# Patient Record
Sex: FEMALE | URBAN - METROPOLITAN AREA
[De-identification: names, ages, dates, MRNs, and addresses within clinical notes are randomized per-mention and may not be internally consistent; named-entity substitution may affect disease eponyms.]

---

## 2020-11-10 ENCOUNTER — OFFICE VISIT (OUTPATIENT)
Dept: URBAN - METROPOLITAN AREA CLINIC 73 | Facility: CLINIC | Age: 85
End: 2020-11-10
Payer: MEDICARE

## 2020-11-10 PROCEDURE — 92014 COMPRE OPH EXAM EST PT 1/>: CPT | Performed by: OPHTHALMOLOGY

## 2020-11-10 ASSESSMENT — INTRAOCULAR PRESSURE
OS: 14
OD: 17

## 2020-11-10 NOTE — IMPRESSION/PLAN
Impression: Macular degeneration, wet OS. Status: Symptomatic.
s/p Lucentis x 33 last 4/24/18
s/p Avastin x 24 last 09/29/2020 (consented 08/04/2020) Plan: The patient notes metamorphopsia. Exam and OCT reveal scant IRF and a PED OS. An IVFA from 06/09/2020 demonstrated leakage. Fundus Photos from 06/09/2020 demonstrated drusen. Recommend Avastin. R/B/A discussed with patient. The risks of Avastin were discussed, including off-label use and compounding pharmacy risks, and the patient elects to proceed with Avastin (bilateral). Discussed AREDS / I Vit and Amsler grid.

## 2020-11-10 NOTE — IMPRESSION/PLAN
Impression: Macular degeneration, wet OD. Status: Symptomatic.
s/p Lucentis x 54 last 4/24/18
s/p Avastin x 25 last 09/29/2020 (consented 08/04/2020) Plan: The patient notes distortion. Exam and OCT reveal a PED and a blunted foveal depression OD. An IVFA from 06/09/2020 demonstrated leakage in the central macula. Fundus Photos from 06/09/2020 demonstrated drusen. Recommend Avastin. R/B/A discussed with patient. The risks of Avastin were discussed, including off-label use and compounding pharmacy risks, and the patient elects to proceed with Avastin (bilateral). The risks of bilateral injections were discussed, and the patient elects to proceed. After 2% Subconjunctival anesthesia & Betadine prep, 1.25mg of Avastin was injected in the eye. Cold compress and Tylenol suggested for pain if needed. Thanks, Batool Neal. 

Return in 4-6 weeks for OCT OU, re eval Nv AMD.

## 2020-12-08 ENCOUNTER — OFFICE VISIT (OUTPATIENT)
Dept: URBAN - METROPOLITAN AREA CLINIC 73 | Facility: CLINIC | Age: 85
End: 2020-12-08
Payer: MEDICARE

## 2020-12-08 DIAGNOSIS — H43.813 VITREOUS DEGENERATION, BILATERAL: ICD-10-CM

## 2020-12-08 DIAGNOSIS — H01.001 UNSPECIFIED BLEPHARITIS RIGHT UPPER EYELID: ICD-10-CM

## 2020-12-08 PROCEDURE — 92014 COMPRE OPH EXAM EST PT 1/>: CPT | Performed by: OPHTHALMOLOGY

## 2020-12-08 PROCEDURE — 92134 CPTRZ OPH DX IMG PST SGM RTA: CPT | Performed by: OPHTHALMOLOGY

## 2020-12-08 ASSESSMENT — INTRAOCULAR PRESSURE
OS: 16
OD: 13

## 2020-12-08 NOTE — IMPRESSION/PLAN
Impression: Macular degeneration, wet OD. Status: Symptomatic.
s/p Lucentis x 54 last 4/24/18
s/p Avastin x 26 last 11/10/2020 (consented 08/04/2020) Plan: The patient notes continued distortion. Exam and OCT reveal a PED and a blunted foveal depression OD. An IVFA from 06/09/2020 demonstrated leakage in the central macula. Fundus Photos from 06/09/2020 demonstrated drusen. Recommend Avastin. R/B/A discussed with patient. The risks of Avastin were discussed, including off-label use and compounding pharmacy risks, and the patient elects to proceed with Avastin (bilateral). The risks of bilateral injections were discussed, and the patient elects to proceed. After 2% Subconjunctival anesthesia & Betadine prep, 1.25mg of Avastin was injected in the eye. Cold compress and Tylenol suggested for pain if needed. Thanks, Vandana Garcia. 

Return in 4-6 weeks for OCT OU, re eval Nv AMD, IVFA OD 1st

## 2020-12-08 NOTE — IMPRESSION/PLAN
Impression: Macular degeneration, wet OS. Status: Symptomatic.
s/p Lucentis x 33 last 4/24/18
s/p Avastin x 25 last 11/10/2020 (consented 08/04/2020) Plan: The patient notes metamorphopsia. Exam and OCT reveal scant IRF and a PED OS. An IVFA from 06/09/2020 demonstrated leakage. Fundus Photos from 06/09/2020 demonstrated drusen. Recommend Avastin. R/B/A discussed with patient. The risks of Avastin were discussed, including off-label use and compounding pharmacy risks, and the patient elects to proceed with Avastin (bilateral). Discussed AREDS / I Vit and Amsler grid.

## 2021-01-19 ENCOUNTER — OFFICE VISIT (OUTPATIENT)
Dept: URBAN - METROPOLITAN AREA CLINIC 73 | Facility: CLINIC | Age: 86
End: 2021-01-19
Payer: MEDICARE

## 2021-01-19 PROCEDURE — 92134 CPTRZ OPH DX IMG PST SGM RTA: CPT | Performed by: OPHTHALMOLOGY

## 2021-01-19 PROCEDURE — 92014 COMPRE OPH EXAM EST PT 1/>: CPT | Performed by: OPHTHALMOLOGY

## 2021-01-19 PROCEDURE — 92235 FLUORESCEIN ANGRPH MLTIFRAME: CPT | Performed by: OPHTHALMOLOGY

## 2021-01-19 ASSESSMENT — INTRAOCULAR PRESSURE
OD: 15
OS: 17

## 2021-01-19 NOTE — IMPRESSION/PLAN
Impression: Macular degeneration, wet OS. Status: Symptomatic.
s/p Lucentis x 33 last 4/24/18
s/p Avastin x 25 last 11/10/2020 (consented 08/04/2020) Plan: The patient notes metamorphopsia. Exam and OCT reveal scant IRF and a PED OS. An IVFA from 01/19/2021 demonstrated leakage. Fundus Photos from 01/19/2021 demonstrated drusen. Recommend Avastin. R/B/A discussed with patient. The risks of Avastin were discussed, including off-label use and compounding pharmacy risks, and the patient elects to proceed with Avastin (bilateral). Discussed AREDS / I Vit and Amsler grid.

## 2021-01-19 NOTE — IMPRESSION/PLAN
Impression: Macular degeneration, wet OD. Status: Symptomatic.
s/p Lucentis x 54 last 4/24/18
s/p Avastin x 27 last 12/8/2020 (consented 08/04/2020) Plan: Exam and OCT reveal a PED and a blunted foveal depression OD. An IVFA from 01/19/2021 demonstrated leakage in the central macula. Fundus Photos from 01/19/2021 demonstrated drusen. Recommend Avastin. R/B/A discussed with patient. The risks of Avastin were discussed, including off-label use and compounding pharmacy risks, and the patient elects to proceed with Avastin (bilateral). The risks of bilateral injections were discussed, and the patient elects to proceed. After 2% Subconjunctival anesthesia & Betadine prep, 1.25mg of Avastin was injected in the eye. Cold compress and Tylenol suggested for pain if needed. Thanks, Archana Lerma. 

Return in 4-6 weeks for OCT OU, re eval Nv AMD, possible Lucentis OU Ria Hu)

## 2021-02-16 ENCOUNTER — OFFICE VISIT (OUTPATIENT)
Dept: URBAN - METROPOLITAN AREA CLINIC 73 | Facility: CLINIC | Age: 86
End: 2021-02-16
Payer: MEDICARE

## 2021-02-16 DIAGNOSIS — H04.123 DRY EYE SYNDROME OF BILATERAL LACRIMAL GLANDS: ICD-10-CM

## 2021-02-16 PROCEDURE — 92014 COMPRE OPH EXAM EST PT 1/>: CPT | Performed by: OPHTHALMOLOGY

## 2021-02-16 ASSESSMENT — INTRAOCULAR PRESSURE
OD: 14
OS: 19

## 2021-02-16 NOTE — IMPRESSION/PLAN
Impression: Macular degeneration, wet OS. Status: Symptomatic.
s/p Lucentis x 33 last 4/24/18
s/p Avastin x 26 last 01/19/2021 (consented 08/04/2020) Plan: The patient notes metamorphopsia. Exam and OCT reveal scant IRF and a PED OS. An IVFA from 01/19/2021 demonstrated leakage. Fundus Photos from 01/19/2021 demonstrated drusen. Recommend Lucentis R/B/A discussed with patient, and the patient elects to proceed with Lucentis (bilateral). Discussed AREDS / I Vit and Amsler grid.

## 2021-02-16 NOTE — IMPRESSION/PLAN
Impression: Macular degeneration, wet OD. Status: Symptomatic.
s/p Lucentis x 54 last 4/24/18
s/p Avastin x 28 last 01/19/2021 (consented 08/04/2020) Plan: The patient notes worsening. Exam and OCT reveal a PED and a blunted foveal depression OD. An IVFA from 01/19/2021 demonstrated leakage in the central macula. Fundus Photos from 01/19/2021 demonstrated drusen. Recommend Lucentis. R/B/A discussed, and the patient elects to proceed with Lucentis (bilateral). Thanks, Maryhelen Aschoff. 

Return in 4-6 weeks for OCT OU, re eval Nv AMD, possible Lucentis OU

## 2021-03-16 ENCOUNTER — OFFICE VISIT (OUTPATIENT)
Dept: URBAN - METROPOLITAN AREA CLINIC 73 | Facility: CLINIC | Age: 86
End: 2021-03-16
Payer: MEDICARE

## 2021-03-16 PROCEDURE — 92014 COMPRE OPH EXAM EST PT 1/>: CPT | Performed by: OPHTHALMOLOGY

## 2021-03-16 PROCEDURE — 92134 CPTRZ OPH DX IMG PST SGM RTA: CPT | Performed by: OPHTHALMOLOGY

## 2021-03-16 ASSESSMENT — INTRAOCULAR PRESSURE
OD: 14
OS: 17

## 2021-03-16 NOTE — IMPRESSION/PLAN
Impression: Macular degeneration, wet OD. Status: Symptomatic.
s/p Lucentis x 54 last 4/24/18
s/p Avastin x 29  last 02/16/2021 (consented 08/04/2020) Plan: Exam and OCT reveal a PED and a blunted foveal depression OD. An IVFA from 01/19/2021 demonstrated leakage in the central macula. Fundus Photos from 01/19/2021 demonstrated drusen. Recommend Lucentis. R/B/A discussed, and the patient elects to proceed with Lucentis (bilateral). Thanks, Robson Mccurdy. 

Return in 4-6 weeks for OCT OU, re eval Nv AMD, possible Lucentis OU

## 2021-03-16 NOTE — IMPRESSION/PLAN
Impression: Macular degeneration, wet OS. Status: Symptomatic.
s/p Lucentis x 33 last 4/24/18
s/p Avastin x 27  last 02/16/2021 (consented 08/04/2020) Plan: The patient notes metamorphopsia. Exam and OCT reveal scant IRF and a PED OS. An IVFA from 01/19/2021 demonstrated leakage. Fundus Photos from 01/19/2021 demonstrated drusen. Recommend Lucentis R/B/A discussed with patient, and the patient elects to proceed with Lucentis (bilateral). Discussed AREDS / I Vit and Amsler grid.

## 2021-04-13 ENCOUNTER — OFFICE VISIT (OUTPATIENT)
Dept: URBAN - METROPOLITAN AREA CLINIC 73 | Facility: CLINIC | Age: 86
End: 2021-04-13
Payer: MEDICARE

## 2021-04-13 PROCEDURE — 92134 CPTRZ OPH DX IMG PST SGM RTA: CPT | Performed by: OPHTHALMOLOGY

## 2021-04-13 PROCEDURE — 92014 COMPRE OPH EXAM EST PT 1/>: CPT | Performed by: OPHTHALMOLOGY

## 2021-04-13 ASSESSMENT — INTRAOCULAR PRESSURE
OD: 15
OS: 17

## 2021-04-13 NOTE — IMPRESSION/PLAN
Impression: Macular degeneration, wet OD. Status: Symptomatic.
s/p Lucentis x 55  last 03/16/2021 
s/p Avastin x 29  last 02/16/2021 (consented 08/04/2020) Plan: Exam and OCT reveal a PED and a blunted foveal depression OD. An IVFA from 01/19/2021 demonstrated leakage in the central macula. Fundus Photos from 01/19/2021 demonstrated drusen. Recommend Lucentis. R/B/A discussed, and the patient elects to proceed with Lucentis (bilateral). Thanks, Josh Raymundo. 

Return in 4-6 weeks for OCT OU, re eval Nv AMD, possible Lucentis OU

## 2021-04-13 NOTE — IMPRESSION/PLAN
Impression: Macular degeneration, wet OS. Status: Symptomatic.
s/p Lucentis x 34  last 03/16/2021 
s/p Avastin x 27  last 02/16/2021 (consented 08/04/2020) Plan: The patient notes metamorphopsia. Exam and OCT reveal scant IRF and a PED OS. An IVFA from 01/19/2021 demonstrated leakage. Fundus Photos from 01/19/2021 demonstrated drusen. Recommend Lucentis R/B/A discussed with patient, and the patient elects to proceed with Lucentis (bilateral). Discussed AREDS / I Vit and Amsler grid.

## 2021-05-25 ENCOUNTER — OFFICE VISIT (OUTPATIENT)
Dept: URBAN - METROPOLITAN AREA CLINIC 73 | Facility: CLINIC | Age: 86
End: 2021-05-25
Payer: MEDICARE

## 2021-05-25 PROCEDURE — 92014 COMPRE OPH EXAM EST PT 1/>: CPT | Performed by: OPHTHALMOLOGY

## 2021-05-25 ASSESSMENT — INTRAOCULAR PRESSURE
OS: 20
OD: 15

## 2021-05-25 NOTE — IMPRESSION/PLAN
Impression: Macular degeneration, wet OS. Status: Symptomatic.
s/p Lucentis x 35  last 04/13/2021
s/p Avastin x 27  last 02/16/2021 (consented 08/04/2020) Plan: The patient notes metamorphopsia. Exam and OCT reveal scant IRF and a PED OS. An IVFA from 01/19/2021 demonstrated leakage. Fundus Photos from 01/19/2021 demonstrated drusen. Recommend Lucentis R/B/A discussed with patient, and the patient elects to proceed with Lucentis (bilateral). Discussed AREDS / I Vit and Amsler grid.

## 2021-05-25 NOTE — IMPRESSION/PLAN
Impression: Macular degeneration, wet OD. Status: Symptomatic.
s/p Lucentis x 56  last 04/13/2021
s/p Avastin x 29  last 02/16/2021 (consented 08/04/2020) Plan: The patient notes blurring. Exam and OCT reveal a PED and a blunted foveal depression OD. An IVFA from 01/19/2021 demonstrated leakage in the central macula. Fundus Photos from 01/19/2021 demonstrated drusen. Recommend Lucentis. R/B/A discussed, and the patient elects to proceed with Lucentis (bilateral). Thanks, Jeffery Henriquez. 

Return in 4-6 weeks for OCT OU, re eval Nv AMD, possible Lucentis OU

## 2021-07-06 ENCOUNTER — OFFICE VISIT (OUTPATIENT)
Dept: URBAN - METROPOLITAN AREA CLINIC 73 | Facility: CLINIC | Age: 86
End: 2021-07-06
Payer: MEDICARE

## 2021-07-06 PROCEDURE — 92014 COMPRE OPH EXAM EST PT 1/>: CPT | Performed by: OPHTHALMOLOGY

## 2021-07-06 ASSESSMENT — INTRAOCULAR PRESSURE
OD: 13
OS: 14

## 2021-07-06 NOTE — IMPRESSION/PLAN
Impression: Macular degeneration, wet OS. Status: Symptomatic.
s/p Lucentis x 36  last 05/25/2021
s/p Avastin x 27  last 02/16/2021 Plan: The patient notes metamorphopsia. Exam and OCT reveal scant IRF and a PED OS. An IVFA from 01/19/2021 demonstrated leakage. Fundus Photos from 01/19/2021 demonstrated drusen. Recommend Lucentis R/B/A discussed with patient, and the patient elects to proceed with Lucentis (bilateral). Discussed AREDS / I Vit and Amsler grid.

## 2021-07-06 NOTE — IMPRESSION/PLAN
Impression: Macular degeneration, wet OD. Status: Symptomatic.
s/p Lucentis x 57  last 05/25/2021
s/p Avastin x 29  last 02/16/2021 Plan: The patient notes continued blurring. Exam and OCT reveal a PED and a blunted foveal depression OD. An IVFA from 01/19/2021 demonstrated leakage in the central macula. Fundus Photos from 01/19/2021 demonstrated drusen. Recommend Lucentis. R/B/A discussed, and the patient elects to proceed with Lucentis (bilateral). Thanks, Earnestine Sanches. 

Return in 4-6 weeks for OCT OU, re eval Nv AMD, possible Lucentis OU, IVFA OD 1st

## 2021-08-31 ENCOUNTER — OFFICE VISIT (OUTPATIENT)
Dept: URBAN - METROPOLITAN AREA CLINIC 73 | Facility: CLINIC | Age: 86
End: 2021-08-31
Payer: MEDICARE

## 2021-08-31 PROCEDURE — 92014 COMPRE OPH EXAM EST PT 1/>: CPT | Performed by: OPHTHALMOLOGY

## 2021-08-31 PROCEDURE — 92235 FLUORESCEIN ANGRPH MLTIFRAME: CPT | Performed by: OPHTHALMOLOGY

## 2021-08-31 PROCEDURE — 92250 FUNDUS PHOTOGRAPHY W/I&R: CPT | Performed by: OPHTHALMOLOGY

## 2021-08-31 ASSESSMENT — INTRAOCULAR PRESSURE
OS: 17
OD: 14

## 2021-08-31 NOTE — IMPRESSION/PLAN
Impression: Macular degeneration, wet OD. Status: Symptomatic.
s/p Lucentis x 58  last 07/06/2021 
s/p Avastin x 29  last 02/16/2021 Plan: Exam and OCT reveal a PED and a blunted foveal depression OD. An IVFA from 08/31/2021 demonstrated leakage in the central macula. Fundus Photos from 08/31/2021 demonstrated drusen. Recommend Lucentis. R/B/A discussed, and the patient elects to proceed with Lucentis (bilateral). Will consider Avastin.  

Return in 4-6 weeks for OCT OU, re eval Nv AMD, possible Avastin OU Amber Greenwood

## 2021-09-28 ENCOUNTER — OFFICE VISIT (OUTPATIENT)
Dept: URBAN - METROPOLITAN AREA CLINIC 73 | Facility: CLINIC | Age: 86
End: 2021-09-28
Payer: MEDICARE

## 2021-09-28 PROCEDURE — 92134 CPTRZ OPH DX IMG PST SGM RTA: CPT | Performed by: OPHTHALMOLOGY

## 2021-09-28 PROCEDURE — 92014 COMPRE OPH EXAM EST PT 1/>: CPT | Performed by: OPHTHALMOLOGY

## 2021-09-28 ASSESSMENT — INTRAOCULAR PRESSURE
OS: 20
OD: 16

## 2021-09-28 NOTE — IMPRESSION/PLAN
Impression: Macular degeneration, wet OS. Status: Symptomatic.
s/p Lucentis x 38  last 08/31/2021 
s/p Avastin x 27  last 02/16/2021 Plan: The patient notes metamorphopsia. Exam and OCT reveal scant IRF and a PED OS. An IVFA from 08/31/2021 demonstrated leakage. Fundus Photos from 08/31/2021 demonstrated drusen. Recommend Avastin. R/B/A discussed with patient. The risks of Avastin were discussed, including off-label use and compounding pharmacy risks, and the patient elects to proceed with Avastin. After 2% Subconjunctival anesthesia & betadine prep, 1.25mg of Avastin was injected in the eye. Cold compress and Tylenol suggested for pain if needed.

## 2021-09-28 NOTE — IMPRESSION/PLAN
Impression: Macular degeneration, wet OD. Status: Symptomatic.
s/p Lucentis x 59  last 08/31/2021 
s/p Avastin x 29  last 02/16/2021 Plan: Exam and OCT reveal a PED and a blunted foveal depression OD. An IVFA from 08/31/2021 demonstrated leakage in the central macula. Fundus Photos from 08/31/2021 demonstrated drusen. Recommend Avastin. R/B/A discussed with patient. The risks of Avastin were discussed, including off-label use and compounding pharmacy risks, and the patient elects to proceed with Avastin. After 2% Subconjunctival anesthesia & betadine prep, 1.25mg of Avastin was injected in the eye. Cold compress and Tylenol suggested for pain if needed. 

Return in 4-6 weeks for OCT OU, re eval Nv AMD, possible Avastin OU

## 2021-11-09 ENCOUNTER — OFFICE VISIT (OUTPATIENT)
Dept: URBAN - METROPOLITAN AREA CLINIC 73 | Facility: CLINIC | Age: 86
End: 2021-11-09
Payer: MEDICARE

## 2021-11-09 PROCEDURE — 92014 COMPRE OPH EXAM EST PT 1/>: CPT | Performed by: OPHTHALMOLOGY

## 2021-11-09 ASSESSMENT — INTRAOCULAR PRESSURE
OS: 15
OD: 13

## 2021-11-09 NOTE — IMPRESSION/PLAN
Impression: Macular degeneration, wet OS. Status: Symptomatic.
s/p Lucentis x 38  last 08/31/2021 
s/p Avastin x 28   last 09/28/2021  Plan: The patient notes metamorphopsia. Exam and OCT reveal scant IRF and a PED OS. An IVFA from 08/31/2021 demonstrated leakage. Fundus Photos from 08/31/2021 demonstrated drusen. Recommend Avastin. R/B/A discussed with patient. The risks of Avastin were discussed, including off-label use and compounding pharmacy risks, and the patient elects to proceed with Avastin. After 2% Subconjunctival anesthesia & betadine prep, 1.25mg of Avastin was injected in the eye. Cold compress and Tylenol suggested for pain if needed.

## 2021-11-09 NOTE — IMPRESSION/PLAN
Impression: Macular degeneration, wet OD. Status: Symptomatic.
s/p Lucentis x 59  last 08/31/2021 
s/p Avastin x 30  last 09/28/2021 Plan: The patient notes blurring. Exam and OCT reveal a PED and a blunted foveal depression OD. An IVFA from 08/31/2021 demonstrated leakage in the central macula. Fundus Photos from 08/31/2021 demonstrated drusen. Recommend Avastin. R/B/A discussed with patient. The risks of Avastin were discussed, including off-label use and compounding pharmacy risks, and the patient elects to proceed with Avastin. After 2% Subconjunctival anesthesia & betadine prep, 1.25mg of Avastin was injected in the eye. Cold compress and Tylenol suggested for pain if needed. 

Return in 4-6 weeks for OCT OU, re eval Nv AMD, possible Avastin OU

## 2021-12-07 ENCOUNTER — OFFICE VISIT (OUTPATIENT)
Dept: URBAN - METROPOLITAN AREA CLINIC 73 | Facility: CLINIC | Age: 86
End: 2021-12-07
Payer: MEDICARE

## 2021-12-07 PROCEDURE — 92014 COMPRE OPH EXAM EST PT 1/>: CPT | Performed by: OPHTHALMOLOGY

## 2021-12-07 ASSESSMENT — INTRAOCULAR PRESSURE
OD: 11
OS: 17

## 2021-12-07 NOTE — IMPRESSION/PLAN
Impression: Macular degeneration, wet OS. Status: Symptomatic.
s/p Lucentis x 38  last 08/31/2021 
s/p Avastin x 29   last 11/09/2021 Plan: The patient notes metamorphopsia. Exam and OCT reveal scant IRF and a PED OS. An IVFA from 08/31/2021 demonstrated leakage. Fundus Photos from 08/31/2021 demonstrated drusen. Recommend Avastin. R/B/A discussed with patient. The risks of Avastin were discussed, including off-label use and compounding pharmacy risks, and the patient elects to proceed with Avastin. After 2% Subconjunctival anesthesia & betadine prep, 1.25mg of Avastin was injected in the eye. Cold compress and Tylenol suggested for pain if needed.

## 2021-12-07 NOTE — IMPRESSION/PLAN
Impression: Macular degeneration, wet OD. Status: Symptomatic.
s/p Lucentis x 59  last 08/31/2021 
s/p Avastin x 31  last 11/09/2021 Plan: The patient notes continued blurring. Exam and OCT reveal a PED and a blunted foveal depression OD. An IVFA from 08/31/2021 demonstrated leakage in the central macula. Fundus Photos from 08/31/2021 demonstrated drusen. Recommend Avastin. R/B/A discussed with patient. The risks of Avastin were discussed, including off-label use and compounding pharmacy risks, and the patient elects to proceed with Avastin. After 2% Subconjunctival anesthesia & betadine prep, 1.25mg of Avastin was injected in the eye. Cold compress and Tylenol suggested for pain if needed. 

Return in 4-6 weeks for OCT OU, re eval Nv AMD, possible Avastin OU

## 2022-01-04 ENCOUNTER — OFFICE VISIT (OUTPATIENT)
Dept: URBAN - METROPOLITAN AREA CLINIC 73 | Facility: CLINIC | Age: 87
End: 2022-01-04
Payer: MEDICARE

## 2022-01-04 DIAGNOSIS — Z96.1 PRESENCE OF INTRAOCULAR LENS: ICD-10-CM

## 2022-01-04 PROCEDURE — 92134 CPTRZ OPH DX IMG PST SGM RTA: CPT | Performed by: OPHTHALMOLOGY

## 2022-01-04 PROCEDURE — 92014 COMPRE OPH EXAM EST PT 1/>: CPT | Performed by: OPHTHALMOLOGY

## 2022-01-04 ASSESSMENT — INTRAOCULAR PRESSURE
OS: 16
OD: 13

## 2022-01-04 NOTE — IMPRESSION/PLAN
Impression: Macular degeneration, wet OD. Status: Symptomatic.
s/p Lucentis x 59  last 08/31/2021 
s/p Avastin x 32   last 12/07/2021  Plan: Exam and OCT reveal a PED and a blunted foveal depression OD. An IVFA from 08/31/2021 demonstrated leakage in the central macula. Fundus Photos from 08/31/2021 demonstrated drusen. Recommend Avastin. R/B/A discussed with patient. The risks of Avastin were discussed, including off-label use and compounding pharmacy risks, and the patient elects to proceed with Avastin. After 2% Subconjunctival anesthesia & betadine prep, 1.25mg of Avastin was injected in the eye. Cold compress and Tylenol suggested for pain if needed. 

Return in 4-6 weeks for OCT OU, re eval Nv AMD, possible Avastin OU, IVFA OD 1st

## 2022-01-04 NOTE — IMPRESSION/PLAN
Impression: Macular degeneration, wet OS. Status: Symptomatic.
s/p Lucentis x 38  last 08/31/2021 
s/p Avastin x 29   last 12/07/2021  Plan: The patient notes metamorphopsia. Exam and OCT reveal scant IRF and a PED OS. An IVFA from 08/31/2021 demonstrated leakage. Fundus Photos from 08/31/2021 demonstrated drusen. Recommend Avastin. R/B/A discussed with patient. The risks of Avastin were discussed, including off-label use and compounding pharmacy risks, and the patient elects to proceed with Avastin. After 2% Subconjunctival anesthesia & betadine prep, 1.25mg of Avastin was injected in the eye. Cold compress and Tylenol suggested for pain if needed.

## 2022-02-01 ENCOUNTER — OFFICE VISIT (OUTPATIENT)
Dept: URBAN - METROPOLITAN AREA CLINIC 73 | Facility: CLINIC | Age: 87
End: 2022-02-01
Payer: MEDICARE

## 2022-02-01 DIAGNOSIS — H35.3231 EXUDATIVE AGE-REL MCLR DEGN, BI, WITH ACTV CHRDL NEOVAS: Primary | ICD-10-CM

## 2022-02-01 PROCEDURE — 92134 CPTRZ OPH DX IMG PST SGM RTA: CPT | Performed by: OPHTHALMOLOGY

## 2022-02-01 PROCEDURE — 92014 COMPRE OPH EXAM EST PT 1/>: CPT | Performed by: OPHTHALMOLOGY

## 2022-02-01 PROCEDURE — 92235 FLUORESCEIN ANGRPH MLTIFRAME: CPT | Performed by: OPHTHALMOLOGY

## 2022-02-01 ASSESSMENT — INTRAOCULAR PRESSURE
OD: 9
OS: 12

## 2022-02-01 NOTE — IMPRESSION/PLAN
Impression: Macular degeneration, wet OD. Status: Symptomatic.
s/p Lucentis x 59  last 08/31/2021 
s/p Avastin x 33   last 01/04/2022  Plan: The patient notes blurring. Exam and OCT reveal a PED and a blunted foveal depression OD. An IVFA from 02/01/2022 demonstrated leakage in the central macula. Fundus Photos from 02/01/2022 demonstrated drusen. Recommend Avastin. R/B/A discussed with patient. The risks of Avastin were discussed, including off-label use and compounding pharmacy risks, and the patient elects to proceed with Avastin. After 2% Subconjunctival anesthesia & betadine prep, 1.25mg of Avastin was injected in the eye. Cold compress and Tylenol suggested for pain if needed. 

Return in 4-6 weeks for OCT OU, re eval Nv AMD, possible Avastin OU

## 2022-02-01 NOTE — IMPRESSION/PLAN
Impression: Macular degeneration, wet OS. Status: Symptomatic.
s/p Lucentis x 38  last 08/31/2021 
s/p Avastin x 30   last 01/04/2022  Plan: The patient notes metamorphopsia. Exam and OCT reveal scant IRF and a PED OS. An IVFA from 02/01/2022 demonstrated leakage. Fundus Photos from 02/01/2022 demonstrated drusen. Recommend Avastin. R/B/A discussed with patient. The risks of Avastin were discussed, including off-label use and compounding pharmacy risks, and the patient elects to proceed with Avastin. After 2% Subconjunctival anesthesia & betadine prep, 1.25mg of Avastin was injected in the eye. Cold compress and Tylenol suggested for pain if needed.

## 2022-03-15 ENCOUNTER — OFFICE VISIT (OUTPATIENT)
Dept: URBAN - METROPOLITAN AREA CLINIC 73 | Facility: CLINIC | Age: 87
End: 2022-03-15
Payer: MEDICARE

## 2022-03-15 PROCEDURE — 92134 CPTRZ OPH DX IMG PST SGM RTA: CPT | Performed by: OPHTHALMOLOGY

## 2022-03-15 PROCEDURE — 92014 COMPRE OPH EXAM EST PT 1/>: CPT | Performed by: OPHTHALMOLOGY

## 2022-03-15 ASSESSMENT — INTRAOCULAR PRESSURE
OS: 15
OD: 12

## 2022-03-15 NOTE — IMPRESSION/PLAN
Impression: Macular degeneration, wet OS. Status: Symptomatic.
s/p Lucentis x 38  last 08/31/2021 
s/p Avastin x 31  last 02/01/2022  Plan: The patient notes metamorphopsia. Exam and OCT reveal scant IRF and a PED OS. An IVFA from 02/01/2022 demonstrated leakage. Fundus Photos from 02/01/2022 demonstrated drusen. Recommend Avastin. R/B/A discussed with patient. The risks of Avastin were discussed, including off-label use and compounding pharmacy risks, and the patient elects to proceed with Avastin. After 2% Subconjunctival anesthesia & betadine prep, 1.25mg of Avastin was injected in the eye. Cold compress and Tylenol suggested for pain if needed.

## 2022-03-15 NOTE — IMPRESSION/PLAN
Impression: Macular degeneration, wet OD. Status: Symptomatic.
s/p Lucentis x 59  last 08/31/2021 
s/p Avastin x 34  last 02/01/2022 Plan: The patient notes continued blurring. Exam and OCT reveal a PED and a blunted foveal depression OD. An IVFA from 02/01/2022 demonstrated leakage in the central macula. Fundus Photos from 02/01/2022 demonstrated drusen. Recommend Avastin. R/B/A discussed with patient. The risks of Avastin were discussed, including off-label use and compounding pharmacy risks, and the patient elects to proceed with Avastin. After 2% Subconjunctival anesthesia & betadine prep, 1.25mg of Avastin was injected in the eye. Cold compress and Tylenol suggested for pain if needed. 

Return in 4-6 weeks for OCT OU, re eval Nv AMD, possible Avastin OU

## 2022-04-26 ENCOUNTER — OFFICE VISIT (OUTPATIENT)
Dept: URBAN - METROPOLITAN AREA CLINIC 73 | Facility: CLINIC | Age: 87
End: 2022-04-26
Payer: MEDICARE

## 2022-04-26 DIAGNOSIS — H35.3231 EXUDATIVE AGE-REL MCLR DEGN, BI, WITH ACTV CHRDL NEOVAS: Primary | ICD-10-CM

## 2022-04-26 DIAGNOSIS — H01.001 UNSPECIFIED BLEPHARITIS RIGHT UPPER EYELID: ICD-10-CM

## 2022-04-26 DIAGNOSIS — Z96.1 PRESENCE OF INTRAOCULAR LENS: ICD-10-CM

## 2022-04-26 DIAGNOSIS — H43.813 VITREOUS DEGENERATION, BILATERAL: ICD-10-CM

## 2022-04-26 PROCEDURE — 92014 COMPRE OPH EXAM EST PT 1/>: CPT | Performed by: OPHTHALMOLOGY

## 2022-04-26 PROCEDURE — 92134 CPTRZ OPH DX IMG PST SGM RTA: CPT | Performed by: OPHTHALMOLOGY

## 2022-04-26 ASSESSMENT — INTRAOCULAR PRESSURE
OD: 14
OS: 17

## 2022-04-26 NOTE — IMPRESSION/PLAN
Impression: Macular degeneration, wet OD. Status: Symptomatic.
s/p Lucentis x 59  last 08/31/2021 
s/p Avastin x 35  last 03/15/2022  Plan: Exam and OCT reveal a PED and a blunted foveal depression OD. An IVFA from 02/01/2022 demonstrated leakage in the central macula. Fundus Photos from 02/01/2022 demonstrated drusen. Recommend Avastin. R/B/A discussed with patient. The risks of Avastin were discussed, including off-label use and compounding pharmacy risks, and the patient elects to proceed with Avastin. After 2% Subconjunctival anesthesia & betadine prep, 1.25mg of Avastin was injected in the eye. Cold compress and Tylenol suggested for pain if needed. 

Return in 4-6 weeks for OCT OU, re eval Nv AMD, possible Avastin OU

## 2022-04-26 NOTE — IMPRESSION/PLAN
Impression: Macular degeneration, wet OS. Status: Symptomatic.
s/p Lucentis x 38  last 08/31/2021 
s/p Avastin x 32  last 03/15/2022 Plan: The patient notes metamorphopsia. Exam and OCT reveal scant IRF and a PED OS. An IVFA from 02/01/2022 demonstrated leakage. Fundus Photos from 02/01/2022 demonstrated drusen. Recommend Avastin. R/B/A discussed with patient. The risks of Avastin were discussed, including off-label use and compounding pharmacy risks, and the patient elects to proceed with Avastin. After 2% Subconjunctival anesthesia & betadine prep, 1.25mg of Avastin was injected in the eye. Cold compress and Tylenol suggested for pain if needed.

## 2022-05-24 ENCOUNTER — OFFICE VISIT (OUTPATIENT)
Dept: URBAN - METROPOLITAN AREA CLINIC 73 | Facility: CLINIC | Age: 87
End: 2022-05-24
Payer: MEDICARE

## 2022-05-24 DIAGNOSIS — H04.123 DRY EYE SYNDROME OF BILATERAL LACRIMAL GLANDS: ICD-10-CM

## 2022-05-24 PROCEDURE — 92014 COMPRE OPH EXAM EST PT 1/>: CPT | Performed by: OPHTHALMOLOGY

## 2022-05-24 PROCEDURE — 92134 CPTRZ OPH DX IMG PST SGM RTA: CPT | Performed by: OPHTHALMOLOGY

## 2022-05-24 ASSESSMENT — INTRAOCULAR PRESSURE
OS: 15
OD: 11

## 2022-05-24 NOTE — IMPRESSION/PLAN
Impression: Macular degeneration, wet OS. Status: Symptomatic.
s/p Lucentis x 38  last 08/31/2021 
s/p Avastin x 33  last 04/26/2022  Plan: The patient notes metamorphopsia. Exam and OCT reveal scant IRF and a PED OS. An IVFA from 02/01/2022 demonstrated leakage. Fundus Photos from 02/01/2022 demonstrated drusen. Recommend Avastin. R/B/A discussed with patient. The risks of Avastin were discussed, including off-label use and compounding pharmacy risks, and the patient elects to proceed with Avastin. After 2% Subconjunctival anesthesia & betadine prep, 1.25mg of Avastin was injected in the eye. Cold compress and Tylenol suggested for pain if needed.

## 2022-05-24 NOTE — IMPRESSION/PLAN
Impression: Macular degeneration, wet OD. Status: Symptomatic.
s/p Lucentis x 59  last 08/31/2021 
s/p Avastin x 36   last 04/26/2022  Plan: Exam and OCT reveal a PED and a blunted foveal depression OD. An IVFA from 02/01/2022 demonstrated leakage in the central macula. Fundus Photos from 02/01/2022 demonstrated drusen. Recommend Avastin. R/B/A discussed with patient. The risks of Avastin were discussed, including off-label use and compounding pharmacy risks, and the patient elects to proceed with Avastin. After 2% Subconjunctival anesthesia & betadine prep, 1.25mg of Avastin was injected in the eye. Cold compress and Tylenol suggested for pain if needed. 

Return in 4-6 weeks for OCT OU, re eval Nv AMD, possible Avastin OU

## 2022-06-21 ENCOUNTER — OFFICE VISIT (OUTPATIENT)
Dept: URBAN - METROPOLITAN AREA CLINIC 73 | Facility: CLINIC | Age: 87
End: 2022-06-21
Payer: MEDICARE

## 2022-06-21 DIAGNOSIS — H01.001 UNSPECIFIED BLEPHARITIS RIGHT UPPER EYELID: ICD-10-CM

## 2022-06-21 DIAGNOSIS — H35.3231 EXUDATIVE AGE-REL MCLR DEGN, BI, WITH ACTV CHRDL NEOVAS: Primary | ICD-10-CM

## 2022-06-21 DIAGNOSIS — Z96.1 PRESENCE OF INTRAOCULAR LENS: ICD-10-CM

## 2022-06-21 DIAGNOSIS — H43.813 VITREOUS DEGENERATION, BILATERAL: ICD-10-CM

## 2022-06-21 DIAGNOSIS — H04.123 DRY EYE SYNDROME OF BILATERAL LACRIMAL GLANDS: ICD-10-CM

## 2022-06-21 PROCEDURE — 92134 CPTRZ OPH DX IMG PST SGM RTA: CPT | Performed by: OPHTHALMOLOGY

## 2022-06-21 PROCEDURE — 92014 COMPRE OPH EXAM EST PT 1/>: CPT | Performed by: OPHTHALMOLOGY

## 2022-06-21 ASSESSMENT — INTRAOCULAR PRESSURE
OD: 12
OS: 15

## 2022-06-21 NOTE — IMPRESSION/PLAN
Impression: Macular degeneration, wet OS. Status: Symptomatic.
s/p Lucentis x 38  last 08/31/2021 
s/p Avastin x 34  last 05/24/2022  Plan: The patient notes metamorphopsia. Exam and OCT reveal scant IRF and a PED OS. An IVFA from 02/01/2022 demonstrated leakage. Fundus Photos from 02/01/2022 demonstrated drusen. Recommend Avastin. R/B/A discussed with patient. The risks of Avastin were discussed, including off-label use and compounding pharmacy risks, and the patient elects to proceed with Avastin. After 2% Subconjunctival anesthesia & betadine prep, 1.25mg of Avastin was injected in the eye. Cold compress and Tylenol suggested for pain if needed.

## 2022-06-21 NOTE — IMPRESSION/PLAN
Impression: Macular degeneration, wet OD. Status: Symptomatic.
s/p Lucentis x 59  last 08/31/2021 
s/p Avastin x 37  last 05/24/2022  Plan: Exam and OCT reveal a PED and a blunted foveal depression OD. An IVFA from 02/01/2022 demonstrated leakage in the central macula. Fundus Photos from 02/01/2022 demonstrated drusen. Recommend Avastin. R/B/A discussed with patient. The risks of Avastin were discussed, including off-label use and compounding pharmacy risks, and the patient elects to proceed with Avastin. After 2% Subconjunctival anesthesia & betadine prep, 1.25mg of Avastin was injected in the eye. Cold compress and Tylenol suggested for pain if needed. 

Return in 4-6 weeks for OCT OU, re eval Nv AMD, possible Avastin OU

## 2022-07-26 ENCOUNTER — OFFICE VISIT (OUTPATIENT)
Facility: LOCATION | Age: 87
End: 2022-07-26
Payer: MEDICARE

## 2022-07-26 DIAGNOSIS — H04.123 DRY EYE SYNDROME OF BILATERAL LACRIMAL GLANDS: ICD-10-CM

## 2022-07-26 DIAGNOSIS — H35.3231 EXUDATIVE AGE-REL MCLR DEGN, BI, WITH ACTV CHRDL NEOVAS: Primary | ICD-10-CM

## 2022-07-26 DIAGNOSIS — H43.813 VITREOUS DEGENERATION, BILATERAL: ICD-10-CM

## 2022-07-26 DIAGNOSIS — Z96.1 PRESENCE OF INTRAOCULAR LENS: ICD-10-CM

## 2022-07-26 DIAGNOSIS — H01.001 UNSPECIFIED BLEPHARITIS RIGHT UPPER EYELID: ICD-10-CM

## 2022-07-26 PROCEDURE — 92134 CPTRZ OPH DX IMG PST SGM RTA: CPT | Performed by: OPHTHALMOLOGY

## 2022-07-26 PROCEDURE — 92014 COMPRE OPH EXAM EST PT 1/>: CPT | Performed by: OPHTHALMOLOGY

## 2022-07-26 ASSESSMENT — INTRAOCULAR PRESSURE
OD: 14
OS: 16

## 2022-07-26 NOTE — IMPRESSION/PLAN
Impression: Macular degeneration, wet OS. Status: Symptomatic.
s/p Lucentis x 38  last 08/31/2021 
s/p Avastin x 35  last 06/21/2022 Plan: The patient notes metamorphopsia. Exam and OCT reveal scant IRF and a PED OS. An IVFA from 02/01/2022 demonstrated leakage. Fundus Photos from 02/01/2022 demonstrated drusen. Recommend Avastin. R/B/A discussed with patient. The risks of Avastin were discussed, including off-label use and compounding pharmacy risks, and the patient elects to proceed with Avastin. After 2% Subconjunctival anesthesia & betadine prep, 1.25mg of Avastin was injected in the eye. Cold compress and Tylenol suggested for pain if needed.

## 2022-07-26 NOTE — IMPRESSION/PLAN
Impression: Macular degeneration, wet OD. Status: Symptomatic.
s/p Lucentis x 59  last 08/31/2021 
s/p Avastin x 38  last 06/21/2022 Plan: The patient notes blurry VA. Exam and OCT reveal a PED and a blunted foveal depression OD. An IVFA from 02/01/2022 demonstrated leakage in the central macula. Fundus Photos from 02/01/2022 demonstrated drusen. Recommend Avastin. R/B/A discussed with patient. The risks of Avastin were discussed, including off-label use and compounding pharmacy risks, and the patient elects to proceed with Avastin. After 2% Subconjunctival anesthesia & betadine prep, 1.25mg of Avastin was injected in the eye. Cold compress and Tylenol suggested for pain if needed. 

Return in 4-6 weeks for OCT OU, re eval Nv AMD, possible Avastin OU

## 2022-09-06 ENCOUNTER — OFFICE VISIT (OUTPATIENT)
Facility: LOCATION | Age: 87
End: 2022-09-06
Payer: MEDICARE

## 2022-09-06 DIAGNOSIS — H01.001 UNSPECIFIED BLEPHARITIS RIGHT UPPER EYELID: ICD-10-CM

## 2022-09-06 DIAGNOSIS — H35.3231 EXUDATIVE AGE-REL MCLR DEGN, BI, WITH ACTV CHRDL NEOVAS: Primary | ICD-10-CM

## 2022-09-06 DIAGNOSIS — Z96.1 PRESENCE OF INTRAOCULAR LENS: ICD-10-CM

## 2022-09-06 DIAGNOSIS — H43.813 VITREOUS DEGENERATION, BILATERAL: ICD-10-CM

## 2022-09-06 DIAGNOSIS — H04.123 DRY EYE SYNDROME OF BILATERAL LACRIMAL GLANDS: ICD-10-CM

## 2022-09-06 PROCEDURE — 92014 COMPRE OPH EXAM EST PT 1/>: CPT | Performed by: OPHTHALMOLOGY

## 2022-09-06 PROCEDURE — 92134 CPTRZ OPH DX IMG PST SGM RTA: CPT | Performed by: OPHTHALMOLOGY

## 2022-09-06 ASSESSMENT — INTRAOCULAR PRESSURE
OS: 17
OD: 13

## 2022-09-06 NOTE — IMPRESSION/PLAN
Impression: Macular degeneration, wet OS. Status: Symptomatic.
s/p Lucentis x 38  last 08/31/2021 
s/p Avastin x 36  last 07/26/2022  Plan: The patient notes metamorphopsia. Exam and OCT reveal scant IRF and a PED OS. An IVFA from 02/01/2022 demonstrated leakage. Fundus Photos from 02/01/2022 demonstrated drusen. Recommend Avastin. R/B/A discussed with patient. The risks of Avastin were discussed, including off-label use and compounding pharmacy risks, and the patient elects to proceed with Avastin. After 2% Subconjunctival anesthesia & betadine prep, 1.25mg of Avastin was injected in the eye. Cold compress and Tylenol suggested for pain if needed.

## 2022-09-06 NOTE — IMPRESSION/PLAN
Impression: Macular degeneration, wet OD. Status: Symptomatic.
s/p Lucentis x 59  last 08/31/2021 
s/p Avastin x 39  last 07/26/2022  Plan: Exam and OCT reveal a PED and a blunted foveal depression OD. An IVFA from 02/01/2022 demonstrated leakage in the central macula. Fundus Photos from 02/01/2022 demonstrated drusen. Recommend Avastin. R/B/A discussed with patient. The risks of Avastin were discussed, including off-label use and compounding pharmacy risks, and the patient elects to proceed with Avastin. After 2% Subconjunctival anesthesia & betadine prep, 1.25mg of Avastin was injected in the eye. Cold compress and Tylenol suggested for pain if needed. 

Return in 4-6 weeks for OCT OU, re eval Nv AMD, possible Avastin OU

## 2022-10-04 ENCOUNTER — OFFICE VISIT (OUTPATIENT)
Facility: LOCATION | Age: 87
End: 2022-10-04
Payer: MEDICARE

## 2022-10-04 DIAGNOSIS — H35.3231 EXUDATIVE AGE-REL MCLR DEGN, BI, WITH ACTV CHRDL NEOVAS: Primary | ICD-10-CM

## 2022-10-04 DIAGNOSIS — Z96.1 PRESENCE OF INTRAOCULAR LENS: ICD-10-CM

## 2022-10-04 DIAGNOSIS — H04.123 DRY EYE SYNDROME OF BILATERAL LACRIMAL GLANDS: ICD-10-CM

## 2022-10-04 DIAGNOSIS — H43.813 VITREOUS DEGENERATION, BILATERAL: ICD-10-CM

## 2022-10-04 DIAGNOSIS — H01.001 UNSPECIFIED BLEPHARITIS RIGHT UPPER EYELID: ICD-10-CM

## 2022-10-04 PROCEDURE — 92134 CPTRZ OPH DX IMG PST SGM RTA: CPT | Performed by: OPHTHALMOLOGY

## 2022-10-04 PROCEDURE — 92014 COMPRE OPH EXAM EST PT 1/>: CPT | Performed by: OPHTHALMOLOGY

## 2022-10-04 ASSESSMENT — INTRAOCULAR PRESSURE
OD: 12
OS: 14

## 2022-10-04 NOTE — IMPRESSION/PLAN
Impression: Macular degeneration, wet OS. Status: Symptomatic.
s/p Lucentis x 38  last 08/31/2021 
s/p Avastin x 37  last 09/06/2022 Plan: The patient notes metamorphopsia. Exam and OCT reveal scant IRF and a PED OS. An IVFA from 02/01/2022 demonstrated leakage. Fundus Photos from 02/01/2022 demonstrated drusen. Recommend Avastin. R/B/A discussed with patient. The risks of Avastin were discussed, including off-label use and compounding pharmacy risks, and the patient elects to proceed with Avastin. After 2% Subconjunctival anesthesia & betadine prep, 1.25mg of Avastin was injected in the eye. Cold compress and Tylenol suggested for pain if needed.

## 2022-10-04 NOTE — IMPRESSION/PLAN
Impression: Macular degeneration, wet OD. Status: Symptomatic.
s/p Lucentis x 59  last 08/31/2021 
s/p Avastin x 40  last 09/06/2022  Plan: The patient notes blurring. Exam and OCT reveal a PED and a blunted foveal depression OD. An IVFA from 02/01/2022 demonstrated leakage in the central macula. Fundus Photos from 02/01/2022 demonstrated drusen. Recommend Avastin. R/B/A discussed with patient. The risks of Avastin were discussed, including off-label use and compounding pharmacy risks, and the patient elects to proceed with Avastin. After 2% Subconjunctival anesthesia & betadine prep, 1.25mg of Avastin was injected in the eye. Cold compress and Tylenol suggested for pain if needed. 

Return in 4-6 weeks for OCT OU, re eval Nv AMD, possible Avastin OU

## 2022-11-01 ENCOUNTER — OFFICE VISIT (OUTPATIENT)
Facility: LOCATION | Age: 87
End: 2022-11-01
Payer: MEDICARE

## 2022-11-01 DIAGNOSIS — H04.123 DRY EYE SYNDROME OF BILATERAL LACRIMAL GLANDS: ICD-10-CM

## 2022-11-01 DIAGNOSIS — H35.3231 EXUDATIVE AGE-REL MCLR DEGN, BI, WITH ACTV CHRDL NEOVAS: Primary | ICD-10-CM

## 2022-11-01 DIAGNOSIS — H43.813 VITREOUS DEGENERATION, BILATERAL: ICD-10-CM

## 2022-11-01 DIAGNOSIS — Z96.1 PRESENCE OF INTRAOCULAR LENS: ICD-10-CM

## 2022-11-01 DIAGNOSIS — H01.001 UNSPECIFIED BLEPHARITIS RIGHT UPPER EYELID: ICD-10-CM

## 2022-11-01 PROCEDURE — 92134 CPTRZ OPH DX IMG PST SGM RTA: CPT | Performed by: OPHTHALMOLOGY

## 2022-11-01 PROCEDURE — 92014 COMPRE OPH EXAM EST PT 1/>: CPT | Performed by: OPHTHALMOLOGY

## 2022-11-01 ASSESSMENT — INTRAOCULAR PRESSURE
OD: 10
OS: 14

## 2022-11-01 NOTE — IMPRESSION/PLAN
Impression: Macular degeneration, wet OD. Status: Symptomatic.
s/p Lucentis x 59  last 08/31/2021 
s/p Avastin x 40  last 10/04/2022 Plan: The patient notes continued blurring. Exam and OCT reveal a PED and a blunted foveal depression OD. An IVFA from 02/01/2022 demonstrated leakage in the central macula. Fundus Photos from 02/01/2022 demonstrated drusen. Recommend Avastin. R/B/A discussed with patient. The risks of Avastin were discussed, including off-label use and compounding pharmacy risks, and the patient elects to proceed with Avastin. After 2% Subconjunctival anesthesia & betadine prep, 1.25mg of Avastin was injected in the eye. Cold compress and Tylenol suggested for pain if needed. 

Return in 4-6 weeks for OCT OU, re eval Nv AMD, possible Avastin OU

## 2022-11-01 NOTE — IMPRESSION/PLAN
Impression: Macular degeneration, wet OS. Status: Symptomatic.
s/p Lucentis x 38  last 08/31/2021 
s/p Avastin x 37  last 10/04/2022 Plan: The patient notes metamorphopsia. Exam and OCT reveal scant IRF and a PED OS. An IVFA from 02/01/2022 demonstrated leakage. Fundus Photos from 02/01/2022 demonstrated drusen. Recommend Avastin. R/B/A discussed with patient. The risks of Avastin were discussed, including off-label use and compounding pharmacy risks, and the patient elects to proceed with Avastin. After 2% Subconjunctival anesthesia & betadine prep, 1.25mg of Avastin was injected in the eye. Cold compress and Tylenol suggested for pain if needed.

## 2022-11-29 ENCOUNTER — OFFICE VISIT (OUTPATIENT)
Facility: LOCATION | Age: 87
End: 2022-11-29
Payer: MEDICARE

## 2022-11-29 DIAGNOSIS — H35.3231 EXUDATIVE AGE-REL MCLR DEGN, BI, WITH ACTV CHRDL NEOVAS: Primary | ICD-10-CM

## 2022-11-29 DIAGNOSIS — Z96.1 PRESENCE OF INTRAOCULAR LENS: ICD-10-CM

## 2022-11-29 DIAGNOSIS — H43.813 VITREOUS DEGENERATION, BILATERAL: ICD-10-CM

## 2022-11-29 DIAGNOSIS — H04.123 DRY EYE SYNDROME OF BILATERAL LACRIMAL GLANDS: ICD-10-CM

## 2022-11-29 DIAGNOSIS — H01.001 UNSPECIFIED BLEPHARITIS RIGHT UPPER EYELID: ICD-10-CM

## 2022-11-29 PROCEDURE — 92014 COMPRE OPH EXAM EST PT 1/>: CPT | Performed by: OPHTHALMOLOGY

## 2022-11-29 PROCEDURE — 92134 CPTRZ OPH DX IMG PST SGM RTA: CPT | Performed by: OPHTHALMOLOGY

## 2022-11-29 ASSESSMENT — INTRAOCULAR PRESSURE
OD: 13
OS: 16

## 2022-11-29 NOTE — IMPRESSION/PLAN
Impression: Macular degeneration, wet OS. Status: Symptomatic.
s/p Lucentis x 38  last 08/31/2021 
s/p Avastin x 38  last 11/01/2022  Plan: The patient notes metamorphopsia. Exam and OCT reveal scant IRF and a PED OS. An IVFA from 02/01/2022 demonstrated leakage. Fundus Photos from 02/01/2022 demonstrated drusen. Recommend Avastin. R/B/A discussed with patient. The risks of Avastin were discussed, including off-label use and compounding pharmacy risks, and the patient elects to proceed with Avastin. After 2% Subconjunctival anesthesia & betadine prep, 1.25mg of Avastin was injected in the eye. Cold compress and Tylenol suggested for pain if needed.

## 2022-11-29 NOTE — IMPRESSION/PLAN
Impression: Macular degeneration, wet OD. Status: Symptomatic.
s/p Lucentis x 59  last 08/31/2021 
s/p Avastin x 41  last 11/01/2022  Plan: Exam and OCT reveal a PED and a blunted foveal depression OD. An IVFA from 02/01/2022 demonstrated leakage in the central macula. Fundus Photos from 02/01/2022 demonstrated drusen. Recommend Avastin. R/B/A discussed with patient. The risks of Avastin were discussed, including off-label use and compounding pharmacy risks, and the patient elects to proceed with Avastin. After 2% Subconjunctival anesthesia & betadine prep, 1.25mg of Avastin was injected in the eye. Cold compress and Tylenol suggested for pain if needed. 

Return in 4-6 weeks for OCT OU, re eval Nv AMD, possible Avastin OU

## 2023-01-10 ENCOUNTER — OFFICE VISIT (OUTPATIENT)
Facility: LOCATION | Age: 88
End: 2023-01-10
Payer: MEDICARE

## 2023-01-10 DIAGNOSIS — Z96.1 PRESENCE OF INTRAOCULAR LENS: ICD-10-CM

## 2023-01-10 DIAGNOSIS — H01.001 UNSPECIFIED BLEPHARITIS RIGHT UPPER EYELID: ICD-10-CM

## 2023-01-10 DIAGNOSIS — H43.813 VITREOUS DEGENERATION, BILATERAL: ICD-10-CM

## 2023-01-10 DIAGNOSIS — H04.123 DRY EYE SYNDROME OF BILATERAL LACRIMAL GLANDS: ICD-10-CM

## 2023-01-10 DIAGNOSIS — H35.3231 EXUDATIVE AGE-REL MCLR DEGN, BI, WITH ACTV CHRDL NEOVAS: Primary | ICD-10-CM

## 2023-01-10 PROCEDURE — 92014 COMPRE OPH EXAM EST PT 1/>: CPT | Performed by: OPHTHALMOLOGY

## 2023-01-10 PROCEDURE — 92134 CPTRZ OPH DX IMG PST SGM RTA: CPT | Performed by: OPHTHALMOLOGY

## 2023-01-10 ASSESSMENT — INTRAOCULAR PRESSURE
OS: 20
OD: 15

## 2023-01-10 NOTE — IMPRESSION/PLAN
Impression: Macular degeneration, wet OS. Status: Symptomatic.
s/p Lucentis x 38  last 08/31/2021 
s/p Avastin x 39  last 11/29/2022  Plan: The patient notes metamorphopsia. Exam and OCT reveal scant IRF and a PED OS. An IVFA from 02/01/2022 demonstrated leakage. Fundus Photos from 02/01/2022 demonstrated drusen. Recommend Avastin. R/B/A discussed with patient. The risks of Avastin were discussed, including off-label use and compounding pharmacy risks, and the patient elects to proceed with Avastin. After 2% Subconjunctival anesthesia & betadine prep, 1.25mg of Avastin was injected in the eye. Cold compress and Tylenol suggested for pain if needed.

## 2023-01-10 NOTE — IMPRESSION/PLAN
Impression: Macular degeneration, wet OD. Status: Symptomatic.
s/p Lucentis x 59  last 08/31/2021 
s/p Avastin x 42  last 11/29/2022  Plan: The patient notes blurring. Exam and OCT reveal a PED and a blunted foveal depression OD. An IVFA from 02/01/2022 demonstrated leakage in the central macula. Fundus Photos from 02/01/2022 demonstrated drusen. Recommend Avastin. R/B/A discussed with patient. The risks of Avastin were discussed, including off-label use and compounding pharmacy risks, and the patient elects to proceed with Avastin. After 2% Subconjunctival anesthesia & betadine prep, 1.25mg of Avastin was injected in the eye. Cold compress and Tylenol suggested for pain if needed. 

Return in 4-6 weeks for OCT OU, re eval Nv AMD, possible Avastin OU

## 2023-02-28 ENCOUNTER — OFFICE VISIT (OUTPATIENT)
Facility: LOCATION | Age: 88
End: 2023-02-28
Payer: MEDICARE

## 2023-02-28 DIAGNOSIS — H01.001 UNSPECIFIED BLEPHARITIS RIGHT UPPER EYELID: ICD-10-CM

## 2023-02-28 DIAGNOSIS — H43.813 VITREOUS DEGENERATION, BILATERAL: ICD-10-CM

## 2023-02-28 DIAGNOSIS — Z96.1 PRESENCE OF INTRAOCULAR LENS: ICD-10-CM

## 2023-02-28 DIAGNOSIS — H04.123 DRY EYE SYNDROME OF BILATERAL LACRIMAL GLANDS: ICD-10-CM

## 2023-02-28 DIAGNOSIS — H35.3231 EXUDATIVE AGE-REL MCLR DEGN, BI, WITH ACTV CHRDL NEOVAS: Primary | ICD-10-CM

## 2023-02-28 PROCEDURE — 92014 COMPRE OPH EXAM EST PT 1/>: CPT | Performed by: OPHTHALMOLOGY

## 2023-02-28 PROCEDURE — 92134 CPTRZ OPH DX IMG PST SGM RTA: CPT | Performed by: OPHTHALMOLOGY

## 2023-02-28 ASSESSMENT — INTRAOCULAR PRESSURE
OD: 20
OS: 23

## 2023-02-28 NOTE — IMPRESSION/PLAN
Impression: Macular degeneration, wet OD. Status: Symptomatic.
s/p Lucentis x 59  last 08/31/2021 
s/p Avastin x 43  last 01/10/2023  Plan: The patient notes continued blurring. Exam and OCT reveal a PED and a blunted foveal depression OD. An IVFA from 02/01/2022 demonstrated leakage in the central macula. Fundus Photos from 02/01/2022 demonstrated drusen. Recommend Avastin. R/B/A discussed with patient. The risks of Avastin were discussed, including off-label use and compounding pharmacy risks, and the patient elects to proceed with Avastin. After 2% Subconjunctival anesthesia & betadine prep, 1.25mg of Avastin was injected in the eye. Cold compress and Tylenol suggested for pain if needed. 

Return in 4-6 weeks for OCT OU, re eval Nv AMD, possible Avastin OU

## 2023-02-28 NOTE — IMPRESSION/PLAN
Impression: Blepharitis, OU. Status: Fair Control. Plan: WCS. Routing to S. Haase CNP to review/place order. Jennifer Perez R.N.--triage

## 2023-02-28 NOTE — IMPRESSION/PLAN
Impression: Macular degeneration, wet OS. Status: Symptomatic.
s/p Lucentis x 38  last 08/31/2021 
s/p Avastin x 40  last 01/10/2023 Plan: The patient notes metamorphopsia. Exam and OCT reveal scant IRF and a PED OS. An IVFA from 02/01/2022 demonstrated leakage. Fundus Photos from 02/01/2022 demonstrated drusen. Recommend Avastin. R/B/A discussed with patient. The risks of Avastin were discussed, including off-label use and compounding pharmacy risks, and the patient elects to proceed with Avastin. After 2% Subconjunctival anesthesia & betadine prep, 1.25mg of Avastin was injected in the eye. Cold compress and Tylenol suggested for pain if needed.

## 2023-04-18 ENCOUNTER — OFFICE VISIT (OUTPATIENT)
Facility: LOCATION | Age: 88
End: 2023-04-18
Payer: MEDICARE

## 2023-04-18 DIAGNOSIS — H04.123 DRY EYE SYNDROME OF BILATERAL LACRIMAL GLANDS: ICD-10-CM

## 2023-04-18 DIAGNOSIS — Z96.1 PRESENCE OF INTRAOCULAR LENS: ICD-10-CM

## 2023-04-18 DIAGNOSIS — H35.3231 EXUDATIVE AGE-REL MCLR DEGN, BI, WITH ACTV CHRDL NEOVAS: Primary | ICD-10-CM

## 2023-04-18 DIAGNOSIS — H43.813 VITREOUS DEGENERATION, BILATERAL: ICD-10-CM

## 2023-04-18 DIAGNOSIS — H01.001 UNSPECIFIED BLEPHARITIS RIGHT UPPER EYELID: ICD-10-CM

## 2023-04-18 PROCEDURE — 92014 COMPRE OPH EXAM EST PT 1/>: CPT | Performed by: OPHTHALMOLOGY

## 2023-04-18 PROCEDURE — 92134 CPTRZ OPH DX IMG PST SGM RTA: CPT | Performed by: OPHTHALMOLOGY

## 2023-04-18 ASSESSMENT — INTRAOCULAR PRESSURE
OD: 14
OS: 15

## 2023-04-18 NOTE — IMPRESSION/PLAN
Impression: Macular degeneration, wet OD. Status: Symptomatic.
s/p Lucentis x 59  last 08/31/2021 
s/p Avastin x 44  last 02/28/2023 Plan: Exam and OCT reveal a PED and a blunted foveal depression OD. An IVFA from 02/01/2022 demonstrated leakage in the central macula. Fundus Photos from 02/01/2022 demonstrated drusen. Recommend Avastin. R/B/A discussed with patient. The risks of Avastin were discussed, including off-label use and compounding pharmacy risks, and the patient elects to proceed with Avastin. After 2% Subconjunctival anesthesia & betadine prep, 1.25mg of Avastin was injected in the eye. Cold compress and Tylenol suggested for pain if needed. AREDS carotenoids. 

Return in 4-6 weeks for OCT OU, re eval Nv AMD, possible Avastin OU

## 2023-04-18 NOTE — IMPRESSION/PLAN
Impression: Macular degeneration, wet OS. Status: Symptomatic.
s/p Lucentis x 38  last 08/31/2021 
s/p Avastin x 41  last 02/28/2023  Plan: The patient notes metamorphopsia. Exam and OCT reveal scant IRF and a PED OS. An IVFA from 02/01/2022 demonstrated leakage. Fundus Photos from 02/01/2022 demonstrated drusen. Recommend Avastin. R/B/A discussed with patient. The risks of Avastin were discussed, including off-label use and compounding pharmacy risks, and the patient elects to proceed with Avastin. After 2% Subconjunctival anesthesia & betadine prep, 1.25mg of Avastin was injected in the eye. Cold compress and Tylenol suggested for pain if needed.

## 2023-05-16 ENCOUNTER — OFFICE VISIT (OUTPATIENT)
Facility: LOCATION | Age: 88
End: 2023-05-16
Payer: MEDICARE

## 2023-05-16 DIAGNOSIS — H04.123 DRY EYE SYNDROME OF BILATERAL LACRIMAL GLANDS: ICD-10-CM

## 2023-05-16 DIAGNOSIS — Z96.1 PRESENCE OF INTRAOCULAR LENS: ICD-10-CM

## 2023-05-16 DIAGNOSIS — H01.001 UNSPECIFIED BLEPHARITIS RIGHT UPPER EYELID: ICD-10-CM

## 2023-05-16 DIAGNOSIS — H35.3231 EXUDATIVE AGE-REL MCLR DEGN, BI, WITH ACTV CHRDL NEOVAS: Primary | ICD-10-CM

## 2023-05-16 DIAGNOSIS — H43.813 VITREOUS DEGENERATION, BILATERAL: ICD-10-CM

## 2023-05-16 PROCEDURE — 92014 COMPRE OPH EXAM EST PT 1/>: CPT | Performed by: OPHTHALMOLOGY

## 2023-05-16 PROCEDURE — 92134 CPTRZ OPH DX IMG PST SGM RTA: CPT | Performed by: OPHTHALMOLOGY

## 2023-05-16 ASSESSMENT — INTRAOCULAR PRESSURE
OS: 15
OD: 13

## 2023-05-16 NOTE — IMPRESSION/PLAN
Impression: Macular degeneration, wet OS. Status: Symptomatic.
s/p Lucentis x 38  last 08/31/2021 
s/p Avastin x 42  last 04/18/2023 Plan: The patient notes metamorphopsia. Exam and OCT reveal scant IRF and a PED OS. An IVFA from 02/01/2022 demonstrated leakage. Fundus Photos from 02/01/2022 demonstrated drusen. Recommend Avastin. R/B/A discussed with patient. The risks of Avastin were discussed, including off-label use and compounding pharmacy risks, and the patient elects to proceed with Avastin. After 2% Subconjunctival anesthesia & betadine prep, 1.25mg of Avastin was injected in the eye. Cold compress and Tylenol suggested for pain if needed.

## 2023-05-16 NOTE — IMPRESSION/PLAN
Impression: Macular degeneration, wet OD. Status: Symptomatic.
s/p Lucentis x 59  last 08/31/2021 
s/p Avastin x 45  last 04/18/2023 Plan: Exam and OCT reveal a PED and a blunted foveal depression OD. An IVFA from 02/01/2022 demonstrated leakage in the central macula. Fundus Photos from 02/01/2022 demonstrated drusen. Recommend Avastin. R/B/A discussed with patient. The risks of Avastin were discussed, including off-label use and compounding pharmacy risks, and the patient elects to proceed with Avastin. After 2% Subconjunctival anesthesia & betadine prep, 1.25mg of Avastin was injected in the eye. Cold compress and Tylenol suggested for pain if needed. AREDS carotenoids. 

Return in 4-6 weeks for OCT OU, re eval Nv AMD, possible Avastin OU

## 2023-08-08 ENCOUNTER — OFFICE VISIT (OUTPATIENT)
Facility: LOCATION | Age: 88
End: 2023-08-08
Payer: MEDICARE

## 2023-08-08 DIAGNOSIS — H35.3231 EXUDATIVE AGE-REL MCLR DEGN, BI, WITH ACTV CHRDL NEOVAS: Primary | ICD-10-CM

## 2023-08-08 DIAGNOSIS — Z96.1 PRESENCE OF INTRAOCULAR LENS: ICD-10-CM

## 2023-08-08 DIAGNOSIS — H43.813 VITREOUS DEGENERATION, BILATERAL: ICD-10-CM

## 2023-08-08 DIAGNOSIS — H01.001 UNSPECIFIED BLEPHARITIS RIGHT UPPER EYELID: ICD-10-CM

## 2023-08-08 PROCEDURE — 99213 OFFICE O/P EST LOW 20 MIN: CPT | Performed by: STUDENT IN AN ORGANIZED HEALTH CARE EDUCATION/TRAINING PROGRAM

## 2023-08-08 PROCEDURE — 92134 CPTRZ OPH DX IMG PST SGM RTA: CPT | Performed by: STUDENT IN AN ORGANIZED HEALTH CARE EDUCATION/TRAINING PROGRAM

## 2023-08-08 ASSESSMENT — INTRAOCULAR PRESSURE
OS: 18
OD: 14

## 2023-10-17 ENCOUNTER — OFFICE VISIT (OUTPATIENT)
Facility: LOCATION | Age: 88
End: 2023-10-17
Payer: MEDICARE

## 2023-10-17 DIAGNOSIS — H35.3231 EXUDATIVE AGE-RELATED MACULAR DEGENERATION, BILATERAL, WITH ACTIVE CHOROIDAL NEOVASCULARIZATION: Primary | ICD-10-CM

## 2023-10-17 PROCEDURE — 92134 CPTRZ OPH DX IMG PST SGM RTA: CPT | Performed by: STUDENT IN AN ORGANIZED HEALTH CARE EDUCATION/TRAINING PROGRAM

## 2023-10-17 ASSESSMENT — INTRAOCULAR PRESSURE
OS: 17
OD: 14

## 2024-01-09 ENCOUNTER — OFFICE VISIT (OUTPATIENT)
Facility: LOCATION | Age: 89
End: 2024-01-09
Payer: MEDICARE

## 2024-01-09 DIAGNOSIS — H35.3231 EXUDATIVE AGE-RELATED MACULAR DEGENERATION, BILATERAL, WITH ACTIVE CHOROIDAL NEOVASCULARIZATION: Primary | ICD-10-CM

## 2024-01-09 PROCEDURE — 92134 CPTRZ OPH DX IMG PST SGM RTA: CPT | Performed by: STUDENT IN AN ORGANIZED HEALTH CARE EDUCATION/TRAINING PROGRAM

## 2024-01-09 ASSESSMENT — INTRAOCULAR PRESSURE
OD: 14
OS: 19

## 2024-04-02 ENCOUNTER — OFFICE VISIT (OUTPATIENT)
Facility: LOCATION | Age: 89
End: 2024-04-02
Payer: MEDICARE

## 2024-04-02 DIAGNOSIS — H43.813 VITREOUS DEGENERATION, BILATERAL: ICD-10-CM

## 2024-04-02 DIAGNOSIS — H01.001 UNSPECIFIED BLEPHARITIS RIGHT UPPER EYELID: ICD-10-CM

## 2024-04-02 DIAGNOSIS — H35.3231 EXUDATIVE AGE-REL MCLR DEGN, BI, WITH ACTV CHRDL NEOVAS: Primary | ICD-10-CM

## 2024-04-02 DIAGNOSIS — Z96.1 PRESENCE OF INTRAOCULAR LENS: ICD-10-CM

## 2024-04-02 PROCEDURE — 99214 OFFICE O/P EST MOD 30 MIN: CPT | Performed by: STUDENT IN AN ORGANIZED HEALTH CARE EDUCATION/TRAINING PROGRAM

## 2024-04-02 PROCEDURE — 92134 CPTRZ OPH DX IMG PST SGM RTA: CPT | Performed by: STUDENT IN AN ORGANIZED HEALTH CARE EDUCATION/TRAINING PROGRAM

## 2024-04-02 ASSESSMENT — INTRAOCULAR PRESSURE
OD: 15
OS: 18

## 2024-08-27 ENCOUNTER — OFFICE VISIT (OUTPATIENT)
Facility: LOCATION | Age: 89
End: 2024-08-27
Payer: MEDICARE

## 2024-08-27 DIAGNOSIS — H35.3231 EXUDATIVE AGE-RELATED MACULAR DEGENERATION, BILATERAL, WITH ACTIVE CHOROIDAL NEOVASCULARIZATION: Primary | ICD-10-CM

## 2024-08-27 ASSESSMENT — INTRAOCULAR PRESSURE
OS: 15
OD: 12

## 2024-11-19 ENCOUNTER — OFFICE VISIT (OUTPATIENT)
Facility: LOCATION | Age: 89
End: 2024-11-19
Payer: MEDICARE

## 2024-11-19 DIAGNOSIS — H01.001 UNSPECIFIED BLEPHARITIS RIGHT UPPER EYELID: ICD-10-CM

## 2024-11-19 DIAGNOSIS — H35.3231 EXUDATIVE AGE-RELATED MACULAR DEGENERATION, BILATERAL, WITH ACTIVE CHOROIDAL NEOVASCULARIZATION: Primary | ICD-10-CM

## 2024-11-19 DIAGNOSIS — H43.813 VITREOUS DEGENERATION, BILATERAL: ICD-10-CM

## 2024-11-19 DIAGNOSIS — Z96.1 PRESENCE OF INTRAOCULAR LENS: ICD-10-CM

## 2024-11-19 PROCEDURE — 92134 CPTRZ OPH DX IMG PST SGM RTA: CPT | Performed by: STUDENT IN AN ORGANIZED HEALTH CARE EDUCATION/TRAINING PROGRAM

## 2024-11-19 PROCEDURE — 99214 OFFICE O/P EST MOD 30 MIN: CPT | Performed by: STUDENT IN AN ORGANIZED HEALTH CARE EDUCATION/TRAINING PROGRAM

## 2024-11-19 ASSESSMENT — INTRAOCULAR PRESSURE
OS: 14
OD: 16

## 2025-02-18 ENCOUNTER — OFFICE VISIT (OUTPATIENT)
Facility: LOCATION | Age: OVER 89
End: 2025-02-18
Payer: MEDICARE

## 2025-02-18 DIAGNOSIS — H35.3231 EXUDATIVE AGE-RELATED MACULAR DEGENERATION, BILATERAL, WITH ACTIVE CHOROIDAL NEOVASCULARIZATION: Primary | ICD-10-CM

## 2025-02-18 PROCEDURE — 92134 CPTRZ OPH DX IMG PST SGM RTA: CPT | Performed by: STUDENT IN AN ORGANIZED HEALTH CARE EDUCATION/TRAINING PROGRAM

## 2025-02-18 ASSESSMENT — INTRAOCULAR PRESSURE
OD: 12
OS: 19

## 2025-05-20 ENCOUNTER — OFFICE VISIT (OUTPATIENT)
Facility: LOCATION | Age: OVER 89
End: 2025-05-20
Payer: MEDICARE

## 2025-05-20 DIAGNOSIS — H35.3231 EXUDATIVE AGE-RELATED MACULAR DEGENERATION, BILATERAL, WITH ACTIVE CHOROIDAL NEOVASCULARIZATION: Primary | ICD-10-CM

## 2025-05-20 PROCEDURE — 92134 CPTRZ OPH DX IMG PST SGM RTA: CPT | Performed by: STUDENT IN AN ORGANIZED HEALTH CARE EDUCATION/TRAINING PROGRAM

## 2025-05-20 ASSESSMENT — INTRAOCULAR PRESSURE
OS: 20
OD: 16

## 2025-08-12 ENCOUNTER — OFFICE VISIT (OUTPATIENT)
Facility: LOCATION | Age: OVER 89
End: 2025-08-12
Payer: MEDICARE

## 2025-08-12 DIAGNOSIS — Z96.1 PRESENCE OF INTRAOCULAR LENS: ICD-10-CM

## 2025-08-12 DIAGNOSIS — H35.3231 EXUDATIVE AGE-REL MCLR DEGN, BI, WITH ACTV CHRDL NEOVAS: Primary | ICD-10-CM

## 2025-08-12 DIAGNOSIS — H01.001 UNSPECIFIED BLEPHARITIS RIGHT UPPER EYELID: ICD-10-CM

## 2025-08-12 DIAGNOSIS — H43.813 VITREOUS DEGENERATION, BILATERAL: ICD-10-CM

## 2025-08-12 PROCEDURE — 92134 CPTRZ OPH DX IMG PST SGM RTA: CPT | Performed by: STUDENT IN AN ORGANIZED HEALTH CARE EDUCATION/TRAINING PROGRAM

## 2025-08-12 PROCEDURE — 99214 OFFICE O/P EST MOD 30 MIN: CPT | Performed by: STUDENT IN AN ORGANIZED HEALTH CARE EDUCATION/TRAINING PROGRAM

## 2025-08-12 ASSESSMENT — INTRAOCULAR PRESSURE
OD: 12
OS: 13